# Patient Record
Sex: FEMALE | Race: WHITE
[De-identification: names, ages, dates, MRNs, and addresses within clinical notes are randomized per-mention and may not be internally consistent; named-entity substitution may affect disease eponyms.]

---

## 2023-03-16 ENCOUNTER — HOSPITAL ENCOUNTER (OUTPATIENT)
Dept: HOSPITAL 47 - EC | Age: 63
Setting detail: OBSERVATION
LOS: 2 days | Discharge: HOME | End: 2023-03-18
Attending: HOSPITALIST | Admitting: HOSPITALIST
Payer: OTHER GOVERNMENT

## 2023-03-16 DIAGNOSIS — Z86.73: ICD-10-CM

## 2023-03-16 DIAGNOSIS — R07.89: Primary | ICD-10-CM

## 2023-03-16 DIAGNOSIS — I50.22: ICD-10-CM

## 2023-03-16 DIAGNOSIS — Z79.82: ICD-10-CM

## 2023-03-16 DIAGNOSIS — Z87.01: ICD-10-CM

## 2023-03-16 DIAGNOSIS — Z90.710: ICD-10-CM

## 2023-03-16 DIAGNOSIS — Z87.891: ICD-10-CM

## 2023-03-16 DIAGNOSIS — Z96.60: ICD-10-CM

## 2023-03-16 DIAGNOSIS — I31.39: ICD-10-CM

## 2023-03-16 DIAGNOSIS — Z88.1: ICD-10-CM

## 2023-03-16 DIAGNOSIS — Z88.8: ICD-10-CM

## 2023-03-16 DIAGNOSIS — E11.9: ICD-10-CM

## 2023-03-16 DIAGNOSIS — Z95.810: ICD-10-CM

## 2023-03-16 DIAGNOSIS — Z79.899: ICD-10-CM

## 2023-03-16 DIAGNOSIS — I42.8: ICD-10-CM

## 2023-03-16 DIAGNOSIS — Z91.040: ICD-10-CM

## 2023-03-16 DIAGNOSIS — Z88.5: ICD-10-CM

## 2023-03-16 DIAGNOSIS — Z98.891: ICD-10-CM

## 2023-03-16 DIAGNOSIS — Z91.018: ICD-10-CM

## 2023-03-16 LAB
BASOPHILS # BLD AUTO: 0 K/UL (ref 0–0.2)
BASOPHILS NFR BLD AUTO: 0 %
EOSINOPHIL # BLD AUTO: 0.4 K/UL (ref 0–0.7)
EOSINOPHIL NFR BLD AUTO: 4 %
ERYTHROCYTE [DISTWIDTH] IN BLOOD BY AUTOMATED COUNT: 3.94 M/UL (ref 3.8–5.4)
ERYTHROCYTE [DISTWIDTH] IN BLOOD: 13.5 % (ref 11.5–15.5)
HCT VFR BLD AUTO: 36.1 % (ref 34–46)
HGB BLD-MCNC: 12.1 GM/DL (ref 11.4–16)
LYMPHOCYTES # SPEC AUTO: 2.9 K/UL (ref 1–4.8)
LYMPHOCYTES NFR SPEC AUTO: 29 %
MCH RBC QN AUTO: 30.6 PG (ref 25–35)
MCHC RBC AUTO-ENTMCNC: 33.3 G/DL (ref 31–37)
MCV RBC AUTO: 91.8 FL (ref 80–100)
MONOCYTES # BLD AUTO: 0.6 K/UL (ref 0–1)
MONOCYTES NFR BLD AUTO: 6 %
NEUTROPHILS # BLD AUTO: 5.9 K/UL (ref 1.3–7.7)
NEUTROPHILS NFR BLD AUTO: 59 %
PLATELET # BLD AUTO: 248 K/UL (ref 150–450)
WBC # BLD AUTO: 10 K/UL (ref 3.8–10.6)

## 2023-03-16 PROCEDURE — 96376 TX/PRO/DX INJ SAME DRUG ADON: CPT

## 2023-03-16 PROCEDURE — 96374 THER/PROPH/DIAG INJ IV PUSH: CPT

## 2023-03-16 PROCEDURE — 86850 RBC ANTIBODY SCREEN: CPT

## 2023-03-16 PROCEDURE — 84484 ASSAY OF TROPONIN QUANT: CPT

## 2023-03-16 PROCEDURE — 85730 THROMBOPLASTIN TIME PARTIAL: CPT

## 2023-03-16 PROCEDURE — 86900 BLOOD TYPING SEROLOGIC ABO: CPT

## 2023-03-16 PROCEDURE — 85610 PROTHROMBIN TIME: CPT

## 2023-03-16 PROCEDURE — 93306 TTE W/DOPPLER COMPLETE: CPT

## 2023-03-16 PROCEDURE — 80048 BASIC METABOLIC PNL TOTAL CA: CPT

## 2023-03-16 PROCEDURE — 71046 X-RAY EXAM CHEST 2 VIEWS: CPT

## 2023-03-16 PROCEDURE — 86901 BLOOD TYPING SEROLOGIC RH(D): CPT

## 2023-03-16 PROCEDURE — 71260 CT THORAX DX C+: CPT

## 2023-03-16 PROCEDURE — 85025 COMPLETE CBC W/AUTO DIFF WBC: CPT

## 2023-03-16 PROCEDURE — 93005 ELECTROCARDIOGRAM TRACING: CPT

## 2023-03-16 PROCEDURE — 83880 ASSAY OF NATRIURETIC PEPTIDE: CPT

## 2023-03-16 PROCEDURE — 99285 EMERGENCY DEPT VISIT HI MDM: CPT

## 2023-03-16 PROCEDURE — 36415 COLL VENOUS BLD VENIPUNCTURE: CPT

## 2023-03-16 PROCEDURE — 96372 THER/PROPH/DIAG INJ SC/IM: CPT

## 2023-03-16 NOTE — ED
General Adult HPI





- General


Chief complaint: Chest Pain


Stated complaint: Left Side Pain


Time Seen by Provider: 23 23:16


Source: EMS


Mode of arrival: EMS


Limitations: no limitations





- History of Present Illness


Initial comments: 


Dictation was produced using dragon dictation software. please excuse any 

grammatical, word or spelling errors. 











Chief Complaint: 62-year-old female presents emergency department for acute 

onset pacemaker/AICD site pain





History of Present Illness: 62-year-old female she presents emergency Department

AICD/pacemaker site pain.  Patient states that starting at 8 PM she noticed 

acute onset AICD site pain.  This device was placed 5 years ago at [place].  

She has not really had much issues with it.  Patient denies any sensation 

feeling like it went off.  Denies any sensation of jolting or a punch in the 

chest.  Patient states that the pain is severe and radiates down the left upper 

extremity.  Patient complains of severe tenderness to the site and left arm.  

Patient was told that she has worsening pericardial effusion ongoing for the 

last several months.  Patient has any sugars of breath.








The ROS documented in this emergency department record has been reviewed and 

confirmed by me.  Those systems with pertinent positive or negative responses 

have been documented in the HPI.  All other systems are other negative and/or 

noncontributory.








PHYSICAL EXAM:


General Impression: Alert and oriented x3, not in acute distress


HEENT: Normocephalic atraumatic, extra-ocular movements intact, pupils equal and

reactive to light bilaterally, mucous membranes moist.


Cardiovascular: Heart regular rate and rhythm


Chest: Able to complete full sentences, no retractions, no tachypnea, 

exquisitely tender to the AICD site over the left chest and left upper 

extremity.  No erythema.  No obvious induration


Abdomen: abdomen soft, non-tender, non-distended, no organomegaly


Musculoskeletal: Pulses present and equal in all extremities, no peripheral 

edema


Motor:  no focal deficits noted


Neurological: CN II-XII grossly intact, no focal motor or sensory deficits noted


Skin: Intact with no visualized rashes


Psych: Normal affect and mood





ED course: 62-year-old female presents emergency Department with AICD site pain 

signs upon arrival are within acceptable limits.  EKG shows ventricularly paced 

rhythm without any complications.  Patient has exquisite tenderness to the AICD 

site.  Does not appear to be infected as there is no erythema or induration





Nursing notes and chart review was performed





EKG interpreted by me: Ventricular rate 77, ventricularly paced rhythm,.  147, 

, . No AZ prolongation, no QTC prolongation, no ST or T-wave 

changes noted.  Overall, this EKG is unremarkable





Was pt. sent in by a medical professional or institution (, PA, NP, urgent 

care, hospital, or nursing home...) When possible be specific


@  -No


Did you speak to anyone other than the patient for history (EMS, parent, family,

police, friend...)? What history was obtained from this source 


@  -No


Did you review nursing and triage notes (agree or disagree)?  Why? 


@  -I reviewed and agree with nursing and triage notes


Were old charts reviewed (outside hosp., previous admission, EMS record, old 

EKG, old radiological studies, urgent care reports/EKG's, nursing home records)?

Report findings 


@  -No old charts were reviewed


Differential Diagnosis (chest pain, altered mental status, abdominal pain women,

abdominal pain men, vaginal bleeding, musculoskeletal, weakness, fever, dyspnea,

syncope, headache, dizziness, GI bleed, back pain, seizure, CVA, palpatations, 

mental health)? 


@  -Differential Chest Pain:


Stable Angina, Unstable Angina, STEMI, NSTEMI Aortic Dissection, Pneumothorax, 

Musculoskeletal, Esophageal Spasm GERD, Cholecystitis, Pancreatitis, Zoster, 

this is not meant to be an all-inclusive list. 





EKG interpreted by me (3pts min.).


@  -see above


X-rays interpreted by me (1pt min.).


@  -None done


CT interpreted by me (1pt min.).


@  -No acute processes.  There does not appear to be any obvious cause of 

patient's exquisite pain along her AICD site


U/S interpreted by me (1pt. min.).


@  -None done


What testing was considered but not performed or refused? (CT, X-rays, U/S, 

labs)? Why?


@  -See above


What meds were considered but not given or refused? Why?


@  -See above


Did you discuss the management of the patient with other professionals 

(professionals i.e. , PA, NP, lab, RT, psych nurse, , , t

eacher, , )? Give summary


@  -Spoke with Tiki of [facility] [place]


Was smoking cessation discussed for >3mins.?


@  -No


Was critical care preformed (if so, how long)?


@  -No


Were there social determinants of health that impacted care today? How? 

(Homelessness, low income, unemployed, alcoholism, drug addiction, 

transportation, low edu. Level, literacy, decrease access to med. care, shelter, 

rehab)?


@  -No


Was there de-escalation of care discussed even if they declined (Discuss DNR or 

withdrawal of care, Hospice)? DNR status


@  -No


What co-morbidities impacted this encounter? (DM, HTN, Smoking, COPD, CAD, 

Cancer, CVA, ARF, Chemo, Hep., AIDS, mental health diagnosis, sleep apnea, 

morbid obesity)?


@  -None


Was patient admitted / discharged? Hospital course, mention meds given and 

route, prescriptions, significant lab abnormalities, going to OR and other perti

nent info.


@  -Year-old female presents to the emergency department with what she describes

as AICD site pain.  She has reproducible tenderness when palpating the soft 

tissue near and around her AICD site.  There does not appear to be any obvious 

abnormalities on physical examination of that area.  No concern for infection.  

Patient is not quite the best historian.  There is a delay with pacer/AICD 

interrogation.  Patient be observed to the report is completed.  She is agreeab

le for admission for pain control and cardiology consultation.


Undiagnosed new problem with uncertain prognosis?


@  -No


Drug Therapy requiring intensive monitoring for toxicity (Heparin, Nitro, 

Insulin, Cardizem)?


@  -No


Were any procedures done?


@  -No


Diagnosis/symptom?  Acute, or Chronic, or Acute on Chronic?  Uncomplicated 

(without systemic symptoms) or Complicated (systemic symptoms)?


@  -1.  Acute AICD site pain


Side effects of treatment?


@  -No


Exacerbation, Progression, or Severe Exacerbation?


@  -No


Poses a threat to life or bodily function? How? (Chest pain, USA, MI, pneumonia,

PE, COPD, DKA, ARF, appy, cholecystitis, CVA, Diverticulitis, Homicidal, 

Suicidal, threat to staff... and all critical care pts)


@  -No








- Related Data


                                    Allergies











Allergy/AdvReac Type Severity Reaction Status Date / Time


 


aripiprazole [From Abilify] Allergy  Swelling Verified 23 23:50


 


cephalexin Allergy  Swelling Verified 23 23:50


 


latex Allergy  Swelling Verified 23 23:50


 


lisinopril Allergy  Wheezing Verified 23 23:50


 


morphine Allergy  Swelling Verified 23 23:50


 


omeprazole [From Prilosec] Allergy  Rash/Hives Verified 23 23:50


 


orange juice Allergy  Swelling Verified 23 23:50


 


oxytetracycline Allergy  Rash/Hives Verified 23 23:50





[From Terramycin]     


 


tetracycline Allergy  Rash/Hives Verified 23 23:50














Review of Systems


ROS Statement: 


Those systems with pertinent positive or pertinent negative responses have been 

documented in the HPI.





ROS Other: All systems not noted in ROS Statement are negative.





Past Medical History


Past Medical History: Chest Pain / Angina, Heart Failure, CVA/TIA, Diabetes Tona

itus, Pneumonia


History of Any Multi-Drug Resistant Organisms: None Reported


Past Surgical History:  Section, Hysterectomy, Joint Replacement, 

Pacemaker


Past Psychological History: No Psychological Hx Reported


Smoking Status: Former smoker


Past Alcohol Use History: None Reported


Past Drug Use History: None Reported





General Exam


Limitations: no limitations





Course


                                   Vital Signs











  23





  23:18 02:07


 


Temperature 98.4 F 


 


Pulse Rate 80 72


 


Respiratory 18 16





Rate  


 


Blood Pressure 122/65 117/97


 


O2 Sat by Pulse 98 98





Oximetry  














Medical Decision Making





- Lab Data


Result diagrams: 


                                 23 23:35





                                 23 23:35


                                   Lab Results











  23 Range/Units





  23:30 23:35 23:35 


 


WBC   10.0   (3.8-10.6)  k/uL


 


RBC   3.94   (3.80-5.40)  m/uL


 


Hgb   12.1   (11.4-16.0)  gm/dL


 


Hct   36.1   (34.0-46.0)  %


 


MCV   91.8   (80.0-100.0)  fL


 


MCH   30.6   (25.0-35.0)  pg


 


MCHC   33.3   (31.0-37.0)  g/dL


 


RDW   13.5   (11.5-15.5)  %


 


Plt Count   248   (150-450)  k/uL


 


MPV   8.5   


 


Neutrophils %   59   %


 


Lymphocytes %   29   %


 


Monocytes %   6   %


 


Eosinophils %   4   %


 


Basophils %   0   %


 


Neutrophils #   5.9   (1.3-7.7)  k/uL


 


Lymphocytes #   2.9   (1.0-4.8)  k/uL


 


Monocytes #   0.6   (0-1.0)  k/uL


 


Eosinophils #   0.4   (0-0.7)  k/uL


 


Basophils #   0.0   (0-0.2)  k/uL


 


PT    10.1  (9.0-12.0)  sec


 


INR    1.0  (<1.2)  


 


APTT    22.0  (22.0-30.0)  sec


 


Sodium     (137-145)  mmol/L


 


Potassium     (3.5-5.1)  mmol/L


 


Chloride     ()  mmol/L


 


Carbon Dioxide     (22-30)  mmol/L


 


Anion Gap     mmol/L


 


BUN     (7-17)  mg/dL


 


Creatinine     (0.52-1.04)  mg/dL


 


Est GFR (CKD-EPI)AfAm     (>60 ml/min/1.73 sqM)  


 


Est GFR (CKD-EPI)NonAf     (>60 ml/min/1.73 sqM)  


 


Glucose     (74-99)  mg/dL


 


Calcium     (8.4-10.2)  mg/dL


 


Troponin I     (0.000-0.034)  ng/mL


 


Blood Type     


 


Blood Type Confirm  AB Positive    


 


Blood Type Recheck     


 


Bld Type Recheck Status     


 


Antibody Screen     


 


Spec Expiration Date     














  23 Range/Units





  23:35 23:35 23:35 


 


WBC     (3.8-10.6)  k/uL


 


RBC     (3.80-5.40)  m/uL


 


Hgb     (11.4-16.0)  gm/dL


 


Hct     (34.0-46.0)  %


 


MCV     (80.0-100.0)  fL


 


MCH     (25.0-35.0)  pg


 


MCHC     (31.0-37.0)  g/dL


 


RDW     (11.5-15.5)  %


 


Plt Count     (150-450)  k/uL


 


MPV     


 


Neutrophils %     %


 


Lymphocytes %     %


 


Monocytes %     %


 


Eosinophils %     %


 


Basophils %     %


 


Neutrophils #     (1.3-7.7)  k/uL


 


Lymphocytes #     (1.0-4.8)  k/uL


 


Monocytes #     (0-1.0)  k/uL


 


Eosinophils #     (0-0.7)  k/uL


 


Basophils #     (0-0.2)  k/uL


 


PT     (9.0-12.0)  sec


 


INR     (<1.2)  


 


APTT     (22.0-30.0)  sec


 


Sodium  138    (137-145)  mmol/L


 


Potassium  3.6    (3.5-5.1)  mmol/L


 


Chloride  105    ()  mmol/L


 


Carbon Dioxide  24    (22-30)  mmol/L


 


Anion Gap  9    mmol/L


 


BUN  24 H    (7-17)  mg/dL


 


Creatinine  1.16 H    (0.52-1.04)  mg/dL


 


Est GFR (CKD-EPI)AfAm  59    (>60 ml/min/1.73 sqM)  


 


Est GFR (CKD-EPI)NonAf  51    (>60 ml/min/1.73 sqM)  


 


Glucose  209 H    (74-99)  mg/dL


 


Calcium  9.3    (8.4-10.2)  mg/dL


 


Troponin I   <0.012   (0.000-0.034)  ng/mL


 


Blood Type    AB Positive  


 


Blood Type Confirm     


 


Blood Type Recheck    No Previous Record  


 


Bld Type Recheck Status    CABO Indicated  


 


Antibody Screen    NEGATIVE  


 


Spec Expiration Date    2023  














Disposition


Clinical Impression: 


 Chest pain





Disposition: ADMITTED AS IP TO THIS [facility]


Condition: Fair


Referrals: 


Nonstaff,Physician [Primary Care Provider] - 1-2 days


Decision Time: 02:17

## 2023-03-17 LAB
ANION GAP SERPL CALC-SCNC: 9 MMOL/L
APTT BLD: 22 SEC (ref 22–30)
BUN SERPL-SCNC: 24 MG/DL (ref 7–17)
CALCIUM SPEC-MCNC: 9.3 MG/DL (ref 8.4–10.2)
CHLORIDE SERPL-SCNC: 105 MMOL/L (ref 98–107)
CO2 SERPL-SCNC: 24 MMOL/L (ref 22–30)
GLUCOSE SERPL-MCNC: 209 MG/DL (ref 74–99)
INR PPP: 1 (ref ?–1.2)
POTASSIUM SERPL-SCNC: 3.6 MMOL/L (ref 3.5–5.1)
PT BLD: 10.1 SEC (ref 9–12)
SODIUM SERPL-SCNC: 138 MMOL/L (ref 137–145)

## 2023-03-17 RX ADMIN — HYDROMORPHONE HYDROCHLORIDE PRN MG: 1 INJECTION, SOLUTION INTRAMUSCULAR; INTRAVENOUS; SUBCUTANEOUS at 20:50

## 2023-03-17 RX ADMIN — BUPROPION HYDROCHLORIDE SCH MG: 150 TABLET, FILM COATED, EXTENDED RELEASE ORAL at 13:09

## 2023-03-17 RX ADMIN — DIGOXIN SCH MCG: 125 TABLET ORAL at 13:09

## 2023-03-17 RX ADMIN — HYDROMORPHONE HYDROCHLORIDE PRN MG: 1 INJECTION, SOLUTION INTRAMUSCULAR; INTRAVENOUS; SUBCUTANEOUS at 02:48

## 2023-03-17 RX ADMIN — CEFAZOLIN SCH MLS/HR: 330 INJECTION, POWDER, FOR SOLUTION INTRAMUSCULAR; INTRAVENOUS at 02:21

## 2023-03-17 RX ADMIN — HYDROMORPHONE HYDROCHLORIDE PRN MG: 1 INJECTION, SOLUTION INTRAMUSCULAR; INTRAVENOUS; SUBCUTANEOUS at 10:21

## 2023-03-17 NOTE — CT
EXAMINATION TYPE: CT chest w con

 

DATE OF EXAM: 3/17/2023

 

COMPARISON: None

 

HISTORY: PAIN AROUND PACEMAKER

 

CT DLP: 229.3 mGycm

Automated exposure control for dose reduction was used.

 

CONTRAST: 

Performed with IV Contrast, patient injected with 80 mL of Isovue 300.

 

Images obtained from the thoracic inlet to the diaphragm with the IV contrast. There are Three-D post
processed images.

 

The lungs are clear of consolidation. No pleural effusion. Heart size is normal. No pericardial effus
ion.

 

There is no mediastinal adenopathy. There are no hilar masses. There is normal contrast opacification
 of the pulmonary arteries. No filling defect. Thoracic aorta is intact. No aneurysm or dissection. T
here is left axillary pacemaker noted.

 

The thoracic spine is intact. No compression fracture. There is multilevel mild degenerative hypertro
phic spurring in the thoracic spine. Sternum is intact. No evidence of rib fracture.

 

IMPRESSION:

Negative CT scan of the chest. No evidence of pulmonary embolism. No pulmonary infiltrates. Left axil
kate pacemaker noted in normal position.

## 2023-03-17 NOTE — P.HPIM
History of Present Illness


H&P Date: 23


Chief Complaint: Chest pain





Patient is a 62-year-old female with a known history of nonischemic 

cardiomyopathy status post ICD placement, CVA/TIA, diabetes type 2, pneumonia a

nd prior history of smoking presents to ER due to complaints of AICD firing.  

Patient states that she had AICD placed about 5 years ago.  She noticed pain in 

the AICD site around 8 PM last night.  Patient states that she received a total 

of 3 shocks.  Denies any associated nausea or vomiting.  No headache or 

dizziness or lightheadedness.  Denies any heart racing or fast.  No complaints 

of worsening chest pain or shortness of breath.  Pain is mainly at the AICD 

site.  No worsening leg swelling recently.


Patient states that she was told she had pericardial effusion.  Denies any fever

or chills.  No cough or sputum production.


EKG showed electronic ventricular pacer rhythm.  CT chest showed negative CT 

scan of the chest.  No evidence of pulm PE.  No pulmonary infiltrates.  Left 

axillary pacemaker noted in normal position.


Chest x-ray showed no acute cardiopulmonary process.


Laboratory showed WBC 10.0 hemoglobin 12.1 and platelets 248 sodium 138 

potassium 3.6 chloride 105 bicarb 24 BUN 24 and creatinine 1.16 and blood sugar 

is 209.  Troponin x1 negative.


proBNP is 53.








Review of Systems





Constitutional: Patient denies any fever or chills . no Generalized weakness.


Abdomen: Patient denied any nausea or vomiting or abd. pain


Cardiovascular: Patient has tenderness over the AICD site.  No chest pain.  No 

shortness of breath no palpitations.  No leg swelling.


Respiratory: patient denied any cough . no sputum production.  No shortness of 

breath


Neurologic: Patient denied any numbness or tingling headache.


Musculoskeletal: Patient denies any complaints of joint swelling or deformity.


Skin: Negative


Psychiatric: Negative


Endocrine: No heat or cold intolerance.  No recent weight gain.


Genitourinary: No dysuria or hematuria.


All other 14 point ROS negative except the above








Past Medical History


Past Medical History: Chest Pain / Angina, Heart Failure, CVA/TIA, Diabetes 

Mellitus, Pneumonia


History of Any Multi-Drug Resistant Organisms: None Reported


Past Surgical History:  Section, Hysterectomy, Joint Replacement, 

Pacemaker


Type of Cardiac Device: Permanent Pacemaker, AICD


Device Placement Date:: 5 years ago


Past Psychological History: No Psychological Hx Reported


Smoking Status: Former smoker


Past Alcohol Use History: None Reported


Past Drug Use History: None Reported





Medications and Allergies


                                Home Medications











 Medication  Instructions  Recorded  Confirmed  Type


 


Aspirin EC [Ecotrin Low Dose] 81 mg PO DAILY 23 History


 


Atorvastatin [Lipitor] 80 mg PO HS 23 History


 


Bumetanide [BUMEX] 0.5 mg PO DAILY 23 History


 


Digoxin [Lanoxin] 125 mcg PO DAILY 23 History


 


Escitalopram [Lexapro] 20 mg PO HS 23 History


 


Losartan [Cozaar] 25 mg PO DAILY 23 History


 


buPROPion XL [Wellbutrin XL] 150 mg PO DAILY 23 History


 


carvediloL [Coreg] 6.25 mg PO BID 23 History








                                    Allergies











Allergy/AdvReac Type Severity Reaction Status Date / Time


 


aripiprazole [From Abilify] Allergy  Dyspnea Verified 23 09:09


 


cephalexin Allergy  Swelling Verified 23 09:09





   all over  


 


latex Allergy  Anaphylaxis Verified 23 09:09


 


lisinopril Allergy  Wheezing, Verified 23 09:09





   Shortness  





   of Breath  


 


morphine Allergy  Swelling Verified 23 09:09


 


omeprazole [From Prilosec] Allergy  Rash/Hives Verified 23 09:09


 


orange juice Allergy  Swelling Verified 23 09:09


 


oxytetracycline Allergy  Rash/Hives Verified 23 09:09





[From Terramycin]     


 


tetracycline Allergy  Rash/Hives Verified 23 09:09














Physical Exam


Vitals: 


                                   Vital Signs











  Temp Pulse Pulse Resp BP BP Pulse Ox


 


 23 07:00  97.7 F   68  16   114/72  100


 


 23 06:00  98.1 F  60   16  122/80   97


 


 23 02:07   72   16  117/97   98


 


 23 23:18  98.4 F  80   18  122/65   98








                                Intake and Output











 23





 22:59 06:59 14:59


 


Intake Total   118


 


Balance   118


 


Intake:   


 


  Oral   118


 


Other:   


 


  Weight  55.338 kg 














PHYSICAL EXAMINATION: 


Patient is lying in the bed comfortably, no acute distress, awake alert and 

oriented.. 


HEENT: Normocephalic. Neck is supple. Pupils reactive. Nostrils clear. Oral 

cavity is moist. 


Neck reveals no JVD, carotid bruits, or thyromegaly. 


CHEST EXAMINATION: Trachea is central. Symmetrical expansion. Lung fields clear 

to auscultation and percussion.


Mild tenderness over the AICD site.  No redness or skin changes noted.


CARDIAC: Normal S1, S2 with no gallops. No murmurs 


ABDOMEN: Soft. Bowel sounds present.  Nontender.  No organomegaly. No abdominal 

bruits. 


Extremities: reveal no edema.  No clubbing or cyanosis


Neurologically awake, alert, oriented x3 with well-coordinated movements.  No 

focal deficits noted


Skin: No rash or skin lesions. 


Psychiatric: Coperative.  Nonsuicidal,


Musculoskeletal: No joint swelling or deformity.  Normal range of motion.








Results


CBC & Chem 7: 


                                 23 23:35





                                 23 23:35


Labs: 


                  Abnormal Lab Results - Last 24 Hours (Table)











  23 Range/Units





  23:35 


 


BUN  24 H  (7-17)  mg/dL


 


Creatinine  1.16 H  (0.52-1.04)  mg/dL


 


Glucose  209 H  (74-99)  mg/dL














Thrombosis Risk Factor Assmnt





- DVT/VTE Prophylaxis


DVT/VTE Prophylaxis: Pharmacologic Prophylaxis ordered





- Choose All That Apply


Each Risk Factor Represents 2 Points: Age 61-74 years


Thrombosis Risk Factor Assessment Total Risk Factor Score: 2


Thrombosis Risk Factor Assessment Level: Low Risk





Assessment and Plan


Assessment: 








Status post AICD discharge x3 episodes and tenderness at the AICD site.


Nonischemic cardiomyopathy with ICD placement 5 years ago.  Patient follows with

his cardiologist at [facility].


Chronic CHF systolic function


Diabetes type 2


Prior history of smoking


History of CVA/TIA


DVT prophylaxis with heparin subcu





Plan:


Patient with continued telemetry monitoring.  AICD interrogation was done.  

Cardiology is on board.


Continue with aspirin and statins, Coreg and digoxin.  Also on losartan.  

Continue pain management and follow-up closely.


2D echocardiogram was done.





Time with Patient: Greater than 30

## 2023-03-17 NOTE — CA
Transthoracic Echo Report 

 Name: Diane Campos 

 MRN:    [id number] 

 Age:    62     Gender:     F 

 

 :    1960 

 Exam Date:     2023 12:57 

 Exam Location: [facility] 

 Ht (in):     61     Wt (lb):     122 

 Ordering Physician:        Jimi Hardwick MD  

                            ([id number]) 

 Attending/Referring Phys: 

 Technician         Blossom Padron RDCS 

 Procedure CPT: 

 Indications:       CP 

 

 Cardiac Hx: 

 Technical Quality:      Fair 

 Contrast 1:                                Total Dose (mL): 

 Contrast 2:                                Total Dose (mL): 

 

 MEASUREMENTS  (Male / Female) Normal Values 

 2D ECHO 

 LV Diastolic Diameter PLAX        4.3 cm                4.2 - 5.9 / 3.9 - 5.3 cm 

 LV Systolic Diameter PLAX         3.1 cm                 

 IVS Diastolic Thickness           1.0 cm                0.6 - 1.0 / 0.6 - 0.9 cm 

 LVPW Diastolic Thickness          1.1 cm                0.6 - 1.0 / 0.6 - 0.9 cm 

 LV Relative Wall Thickness        0.5                    

 RV Internal Dim ED PLAX           2.2 cm                 

 LA Systolic Diameter LX           4.0 cm                3.0 - 4.0 / 2.7 - 3.8 cm 

 LA Volume                         41.1 cm???              18 - 58 / 22 - 52 cm??? 

 

 M-MODE 

 Aortic Root Diameter MM           2.2 cm                 

 LA Systolic Diameter MM           3.7 cm                 

 LA Ao Ratio MM                    1.6                    

 AV Cusp Separation MM             1.5 cm                 

 

 DOPPLER 

 AV Peak Velocity                  142.3 cm/s             

 AV Peak Gradient                  8.1 mmHg               

 AI Peak Velocity                  302.0 cm/s             

 AI Peak Gradient                  36.5 mmHg              

 AI Pressure Half Time             619.5 ms               

 LVOT Peak Velocity                100.5 cm/s             

 LVOT Peak Gradient                4.0 mmHg               

 MV Area PHT                       3.8 cm???                

 MR Peak Velocity                  526.7 cm/s             

 MR Peak Gradient                  111.0 mmHg             

 Mitral E Point Velocity           115.5 cm/s             

 Mitral A Point Velocity           96.7 cm/s              

 Mitral E to A Ratio               1.2                    

 MV Deceleration Time              201.9 ms               

 TR Peak Velocity                  239.2 cm/s             

 TR Peak Gradient                  22.9 mmHg              

 Right Atrial Pressure             8.0 mmHg               

 Pulmonary Artery Systolic Pressu  30.9 mmHg              

 Right Ventricular Systolic Press  30.9 mmHg              

 

 

 FINDINGS 

 Left Ventricle 

 Mildly increased posterior wall thickness. Left ventricular cavity size normal.  

 Left ventricular ejection fraction is estimated at 60 %. 

 

 Right Ventricle 

 Normal right ventricular size. Normal right ventricular global systolic  

 function. Right ventricular systolic pressure within normal limits. 

 

 Right Atrium 

 Normal right atrial size. Catheter/pacemaker wire in the right atrial cavity. 

 

 Left Atrium 

 Normal left atrial size. 

 

 Mitral Valve 

 Structurally normal mitral valve. Moderate mitral regurgitation. 

 

 Aortic Valve 

 Trileaflet aortic valve. 

 

 Tricuspid Valve 

 Structurally normal tricuspid valve. Mild tricuspid regurgitation. 

 

 Pulmonic Valve 

 Structurally normal pulmonic valve. Trace pulmonic regurgitation. 

 

 Pericardium 

 Normal pericardium. No pericardial or pleural effusion. 

 

 Aorta 

 Normal size aortic root and proximal ascending aorta. 

 

 CONCLUSIONS 

 Normal LV systolic function 

 Moderate mitral regurgitation 

 Previewed by:  

 Dr. Jimi Hardwick MD 

 (Electronically Signed) 

 Final Date:      2023 18:18

## 2023-03-17 NOTE — XR
EXAMINATION TYPE: XR chest 2V

 

DATE OF EXAM: 3/17/2023 9:06 AM

 

COMPARISON: CT chest 3/17/2023

 

TECHNIQUE: XR chest 2V Frontal and lateral views of the chest.

 

CLINICAL INDICATION:Female, 62 years old with history of cp; 

 

FINDINGS: 

Lungs/Pleura: There is flattening of the diaphragm with increased lucency of the lungs. No evidence o
f pneumothorax, pleural effusion or focal consolidation. 

Pulmonary vascularity: Unremarkable.

Heart/mediastinum: Cardiomediastinal silhouette is unremarkable. Three lead cardiac conduction device
 overlying the left hemithorax with lead tips projecting over the right ventricle, right atrium and c
oronary sinus.

Musculoskeletal: No acute osseous pathology. No acute osseous abnormality.

 

IMPRESSION: 

No acute cardiopulmonary disease/process.

## 2023-03-17 NOTE — P.CRDCN
History of Present Illness


Consult date: 23


Chief complaint: Chest pain


History of present illness: 





The patient is a 62-year-old female patient who sees a cardiologist at [facility] with a past medical history significant for nonischemic cardiomyopathy 

and status post AICD as well as multiple comorbid conditions presented to the 

hospital complaining of an AICD shocks.  The patient had the AICD placed 5 years

ago according to her.  No details are available at this point regarding the 

patient medical history.  She sees a cardiologist in [place] on regular basis. 

For the last few days she has been receiving an AICD shocks.  She received a 

total of 3 shocks.  The AICD is in process of being interrogated.  The shocks 

are not associated with any dizziness or lightheadedness or any feeling of heart

racing or fluttering but associated with severe discomfort.  The discomfort o

bviously is above the AICD pocket.  Otherwise she stated that she has not been 

experiencing any increasing in the shortness of breath.  She does have shortness

of breath with exertion consistent with NYHA class II has not changed according 

to her.  No lower extremities edema.  No change in the weight.  No dizziness or 

lightheadedness or any presyncope or syncope.  She underwent further workup incl

uding computed tomography scan of the chest showed no acute abnormalities 

including pulmonary embolism and she also underwent CBC and BNP came in to be 

unremarkable.  The first set of troponin came in to be unremarkable.  No M.D. 

proBNP was ordered and no chest x-ray ordered and we are going to obtain both.  

Also an echocardiogram is in process to be done inside the AICD of in process to

be debilitated as well.  Examination is remarkable for stable vital signs with 

regular rhythm and systolic murmur was here in the right upper sternal border as

well as apical area with clear breathing sounds bilaterally and no lower 

extremity edema noted and no abdominal distention noted as well.





Assessment


Status post defibrillator discharge.  Appropriate versus inappropriate discharge

to be assessed


History of nonischemic cardiomyopathy


Chest discomfort secondary to AICD discharge


Multiple comorbid conditions





Plan


AICD interrogation


Obtain an echo to establish LV function


Follow-up with the serial cardiac enzymes


Obtain NT proBNP and the chest x-ray


The patient doesn't seems to be in any overt congestive heart failure at this 

point


Follow-up with the patient





Past Medical History


Past Medical History: Chest Pain / Angina, Heart Failure, CVA/TIA, Diabetes 

Mellitus, Pneumonia


History of Any Multi-Drug Resistant Organisms: None Reported


Past Surgical History:  Section, Hysterectomy, Joint Replacement, 

Pacemaker


Past Psychological History: No Psychological Hx Reported


Smoking Status: Former smoker


Past Alcohol Use History: None Reported


Past Drug Use History: None Reported





Medications and Allergies


                                    Allergies











Allergy/AdvReac Type Severity Reaction Status Date / Time


 


aripiprazole [From Abilify] Allergy  Swelling Verified 23 23:50


 


cephalexin Allergy  Swelling Verified 23 23:50


 


latex Allergy  Swelling Verified 23 23:50


 


lisinopril Allergy  Wheezing Verified 23 23:50


 


morphine Allergy  Swelling Verified 23 23:50


 


omeprazole [From Prilosec] Allergy  Rash/Hives Verified 23 23:50


 


orange juice Allergy  Swelling Verified 23 23:50


 


oxytetracycline Allergy  Rash/Hives Verified 23 23:50





[From Terramycin]     


 


tetracycline Allergy  Rash/Hives Verified 23 23:50














Physical Exam


Vitals: 


                                   Vital Signs











  Temp Pulse Pulse Resp BP BP Pulse Ox


 


 23 07:00  97.7 F   68  16   114/72  100


 


 23 06:00  98.1 F  60   16  122/80   97


 


 23 02:07   72   16  117/97   98


 


 23 23:18  98.4 F  80   18  122/65   98








                                Intake and Output











 23





 22:59 06:59 14:59


 


Other:   


 


  Weight  55.338 kg 














Results





                                 23 23:35





                                 23 23:35


                                 Cardiac Enzymes











  23 Range/Units





  23:35 


 


Troponin I  <0.012  (0.000-0.034)  ng/mL








                                   Coagulation











  23 Range/Units





  23:35 


 


PT  10.1  (9.0-12.0)  sec


 


APTT  22.0  (22.0-30.0)  sec








                                       CBC











  23 Range/Units





  23:35 


 


WBC  10.0  (3.8-10.6)  k/uL


 


RBC  3.94  (3.80-5.40)  m/uL


 


Hgb  12.1  (11.4-16.0)  gm/dL


 


Hct  36.1  (34.0-46.0)  %


 


Plt Count  248  (150-450)  k/uL








                          Comprehensive Metabolic Panel











  23 Range/Units





  23:35 


 


Sodium  138  (137-145)  mmol/L


 


Potassium  3.6  (3.5-5.1)  mmol/L


 


Chloride  105  ()  mmol/L


 


Carbon Dioxide  24  (22-30)  mmol/L


 


BUN  24 H  (7-17)  mg/dL


 


Creatinine  1.16 H  (0.52-1.04)  mg/dL


 


Glucose  209 H  (74-99)  mg/dL


 


Calcium  9.3  (8.4-10.2)  mg/dL








                               Current Medications











Generic Name Dose Route Start Last Admin





  Trade Name Freq  PRN Reason Stop Dose Admin


 


Hydromorphone HCl  0.5 mg  23 02:10  23 02:48





  Hydromorphone 0.5 Mg/0.5 Ml Syringe  IVP   0.5 mg





  Q3HR PRN   Administration





  Moderate Pain (Scale 4 to 6)  


 


Sodium Chloride  1,000 mls @ 20 mls/hr  23 02:15  23 02:21





  Saline 0.9%  IV   20 mls/hr





  .Q24H MILAN   Administration


 


Miscellaneous Information  1 each  23 23:27 





  Rx Info: Iv Contrast Was Given 1 Each Misc  MISCELLANE  23 23:27 





  DAILY PRN  





  Per Protocol  


 


Naloxone HCl  0.2 mg  23 02:10 





  Naloxone 0.4 Mg/Ml 1 Ml Vial  IV  





  Q2M PRN  





  Opioid Reversal  








                                Intake and Output











 23





 22:59 06:59 14:59


 


Other:   


 


  Weight  55.338 kg 








                                        





                                 23 23:35 





                                 23 23:35

## 2023-03-18 VITALS — DIASTOLIC BLOOD PRESSURE: 66 MMHG | TEMPERATURE: 97.8 F | SYSTOLIC BLOOD PRESSURE: 111 MMHG | HEART RATE: 68 BPM

## 2023-03-18 VITALS — RESPIRATION RATE: 16 BRPM

## 2023-03-18 LAB
ANION GAP SERPL CALC-SCNC: 8.6 MMOL/L (ref 10–18)
BUN SERPL-SCNC: 16.8 MG/DL (ref 9–27)
BUN/CREAT SERPL: 15.27 RATIO (ref 12–20)
CALCIUM SPEC-MCNC: 9.1 MG/DL (ref 8.7–10.3)
CHLORIDE SERPL-SCNC: 106 MMOL/L (ref 96–109)
CO2 SERPL-SCNC: 28.4 MMOL/L (ref 20–27.5)
GLUCOSE SERPL-MCNC: 152 MG/DL (ref 70–110)
POTASSIUM SERPL-SCNC: 3.9 MMOL/L (ref 3.5–5.5)
SODIUM SERPL-SCNC: 143 MMOL/L (ref 135–145)

## 2023-03-18 RX ADMIN — CEFAZOLIN SCH: 330 INJECTION, POWDER, FOR SOLUTION INTRAMUSCULAR; INTRAVENOUS at 01:39

## 2023-03-18 RX ADMIN — DIGOXIN SCH MCG: 125 TABLET ORAL at 09:02

## 2023-03-18 RX ADMIN — HYDROMORPHONE HYDROCHLORIDE PRN MG: 1 INJECTION, SOLUTION INTRAMUSCULAR; INTRAVENOUS; SUBCUTANEOUS at 09:02

## 2023-03-18 RX ADMIN — BUPROPION HYDROCHLORIDE SCH MG: 150 TABLET, FILM COATED, EXTENDED RELEASE ORAL at 09:02

## 2023-03-18 NOTE — P.PN
Subjective





The patient is a 62-year-old female patient who sees a cardiologist at [facility] with a past medical history significant for nonischemic cardiomyopathy 

and status post AICD as well as multiple comorbid conditions presented to the 

hospital complaining of an AICD shocks.  The patient had the AICD placed 5 years

ago according to her.  No details are available at this point regarding the 

patient medical history.  She sees a cardiologist in [place] on regular basis. 

For the last few days she has been receiving an AICD shocks.  She received a 

total of 3 shocks.  The AICD is in process of being interrogated.  The shocks 

are not associated with any dizziness or lightheadedness or any feeling of heart

racing or fluttering but associated with severe discomfort.  The discomfort 

obviously is above the AICD pocket.  Otherwise she stated that she has not been 

experiencing any increasing in the shortness of breath.  She does have shortness

of breath with exertion consistent with NYHA class II has not changed according 

to her.  No lower extremities edema.  No change in the weight.  No dizziness or 

lightheadedness or any presyncope or syncope.  She underwent further workup 

including computed tomography scan of the chest showed no acute abnormalities 

including pulmonary embolism and she also underwent CBC and BNP came in to be 

unremarkable.  The first set of troponin came in to be unremarkable.  No M.D. 

proBNP was ordered and no chest x-ray ordered and we are going to obtain both.  

Also an echocardiogram is in process to be done inside the AICD of in process to

be debilitated as well.  Examination is remarkable for stable vital signs with 

regular rhythm and systolic murmur was here in the right upper sternal border as

well as apical area with clear breathing sounds bilaterally and no lower 

extremity edema noted and no abdominal distention noted as well.








3/18


Patient seen and examined.  Echo shows preserved EF without significant valvular

disease.  BNP was noted to be normal.  AICD interrogation shows no actual 

discharge and normally functioning defibrillator.  Her symptoms are more actual 

chest pain over the site of her AICD and is positional and reproducible with 

palpation.





PHYSICAL EXAMINATION


Vital signs reviewed.


CONSTITUTIONAL: No apparent distress. 


HEENT: Head is normocephalic. Pupils are equal, round. Sclerae anicteric. Mucous

membranes of the mouth are moist.  No JVD. No carotid bruit.


CHEST EXAMINATION: Lungs are clear to auscultation. No chest wall tenderness is 

noted on palpation or with deep breathing. 


HEART EXAMINATION: Regular rate and rhythm. S1, S2 heard. No murmurs, gallops or

rub.


ABDOMEN: Soft, nontender. Positive bowel sounds.


EXTREMITIES: 2+ peripheral pulses, no lower extremity edema and no calf 

tenderness.


NEUROLOGIC EXAMINATION: Patient is awake, alert and oriented x3. 








Assessment


Atypical musculoskeletal chest pain, no actual AICD discharge


History of nonischemic cardiomyopathy, recovered EF


Status post AICD placement


Multiple comorbid conditions





Plan


Continue with current regimen.  Should patient's chest pain is musculoskeletal 

and does not appear to be any complication from her defibrillator.  No actual 

discharge on AICD interrogation.  Echo showing preserved EF.  Follow-up with her

primary cardiologist in 1-2 weeks.





Objective





- Vital Signs


Vital signs: 


                                   Vital Signs











Temp  98.1 F   03/18/23 08:01


 


Pulse  72   03/18/23 08:01


 


Resp  16   03/18/23 08:01


 


BP  108/57   03/18/23 08:01


 


Pulse Ox  98   03/18/23 08:01


 


FiO2      








                                 Intake & Output











 03/17/23 03/18/23 03/18/23





 18:59 06:59 18:59


 


Intake Total 400 500 


 


Balance 400 500 


 


Intake:   


 


  Intake, IV Titration 160  





  Amount   


 


    Sodium Chloride 0.9% 1, 160  





    000 ml @ 20 mls/hr IV .   





    [facility][id number]   


 


  Oral 240 500 


 


Other:   


 


  Voiding Method  Toilet 














- Labs


CBC & Chem 7: 


                                 03/16/23 23:35





                                 03/16/23 23:35